# Patient Record
Sex: FEMALE | NOT HISPANIC OR LATINO | URBAN - METROPOLITAN AREA
[De-identification: names, ages, dates, MRNs, and addresses within clinical notes are randomized per-mention and may not be internally consistent; named-entity substitution may affect disease eponyms.]

---

## 2020-05-13 ENCOUNTER — IMPORTED ENCOUNTER (OUTPATIENT)
Dept: URBAN - METROPOLITAN AREA CLINIC 1 | Facility: CLINIC | Age: 30
End: 2020-05-13

## 2020-05-13 PROBLEM — H52.13: Noted: 2020-05-13

## 2020-05-13 PROCEDURE — S0620 ROUTINE OPHTHALMOLOGICAL EXA: HCPCS

## 2020-05-13 NOTE — PATIENT DISCUSSION
1. Myopia -- Rx was given for correction if indicated and requested. Return for an appointment in 1 year 36 with Dr. Kim Long.

## 2021-05-14 ENCOUNTER — IMPORTED ENCOUNTER (OUTPATIENT)
Dept: URBAN - METROPOLITAN AREA CLINIC 1 | Facility: CLINIC | Age: 31
End: 2021-05-14

## 2021-05-14 PROBLEM — H52.223: Noted: 2021-05-14

## 2021-05-14 PROBLEM — H52.13: Noted: 2021-05-14

## 2021-05-14 PROCEDURE — S0621 ROUTINE OPHTHALMOLOGICAL EXA: HCPCS

## 2021-05-14 NOTE — PATIENT DISCUSSION
1. Myopia w/ Astigmatism OU -- Rx was given for correction if indicated and requested. CTL Trials given to patient (Biotrue One Day). Return for an appointment in [address] week cc with Dr. Brandi Bridges.

## 2021-05-20 ENCOUNTER — IMPORTED ENCOUNTER (OUTPATIENT)
Dept: URBAN - METROPOLITAN AREA CLINIC 1 | Facility: CLINIC | Age: 31
End: 2021-05-20

## 2021-05-20 NOTE — PATIENT DISCUSSION
1. CC Today- Good fit comfort and vision in CTL today Finalized CTL Mrx todayReturn for an appointment in 1 year 40/CC with Dr. Brandi Bridges.

## 2022-04-08 ASSESSMENT — KERATOMETRY
OS_K2POWER_DIOPTERS: 45.00
OD_K2POWER_DIOPTERS: 45.25
OS_K1POWER_DIOPTERS: 43.75
OD_K1POWER_DIOPTERS: 44.25
OD_AXISANGLE_DEGREES: 173
OS_AXISANGLE_DEGREES: 173
OS_AXISANGLE2_DEGREES: 083
OD_AXISANGLE2_DEGREES: 083

## 2022-04-08 ASSESSMENT — VISUAL ACUITY
OS_SC: 20/20
OD_CC: J1+
OD_SC: 20/20
OS_CC: J1+
OS_SC: 20/20
OS_SC: 20/20-1
OD_SC: 20/20
OD_SC: 20/20

## 2022-04-08 ASSESSMENT — TONOMETRY
OD_IOP_MMHG: 15
OD_IOP_MMHG: 12
OS_IOP_MMHG: 12
OS_IOP_MMHG: 15